# Patient Record
Sex: FEMALE | Race: WHITE | NOT HISPANIC OR LATINO | Employment: FULL TIME | URBAN - METROPOLITAN AREA
[De-identification: names, ages, dates, MRNs, and addresses within clinical notes are randomized per-mention and may not be internally consistent; named-entity substitution may affect disease eponyms.]

---

## 2017-06-13 ENCOUNTER — CONVERSION ENCOUNTER (OUTPATIENT)
Dept: GENERAL RADIOLOGY | Facility: HOSPITAL | Age: 42
End: 2017-06-13

## 2018-09-05 ENCOUNTER — CONVERSION ENCOUNTER (OUTPATIENT)
Dept: GENERAL RADIOLOGY | Facility: HOSPITAL | Age: 43
End: 2018-09-05

## 2020-02-06 ENCOUNTER — HOSPITAL ENCOUNTER (OUTPATIENT)
Dept: MAMMOGRAPHY | Facility: HOSPITAL | Age: 45
Discharge: HOME | End: 2020-02-06
Attending: OBSTETRICS & GYNECOLOGY

## 2021-03-23 ENCOUNTER — HOSPITAL ENCOUNTER (OUTPATIENT)
Dept: VACCINE CLINIC | Facility: HOSPITAL | Age: 46
Discharge: HOME | End: 2021-03-23
Attending: INTERNAL MEDICINE

## 2021-04-13 ENCOUNTER — HOSPITAL ENCOUNTER (OUTPATIENT)
Dept: VACCINE CLINIC | Facility: HOSPITAL | Age: 46
Discharge: HOME | End: 2021-04-13
Attending: INTERNAL MEDICINE

## 2022-07-13 ENCOUNTER — TRANSCRIBE ORDERS (OUTPATIENT)
Dept: ADMINISTRATIVE | Facility: HOSPITAL | Age: 47
End: 2022-07-13

## 2022-07-13 DIAGNOSIS — Z12.31 SCREENING MAMMOGRAM FOR HIGH-RISK PATIENT: Primary | ICD-10-CM

## 2022-09-12 ENCOUNTER — HOSPITAL ENCOUNTER (OUTPATIENT)
Dept: MAMMOGRAPHY | Facility: HOSPITAL | Age: 47
Discharge: HOME | End: 2022-09-12
Admitting: INTERNAL MEDICINE

## 2022-09-12 DIAGNOSIS — Z12.31 SCREENING MAMMOGRAM FOR HIGH-RISK PATIENT: ICD-10-CM

## 2022-09-12 PROCEDURE — 77067 SCR MAMMO BI INCL CAD: CPT

## 2022-09-12 PROCEDURE — 77063 BREAST TOMOSYNTHESIS BI: CPT

## 2022-10-24 ENCOUNTER — TELEPHONE (OUTPATIENT)
Dept: GASTROENTEROLOGY | Facility: CLINIC | Age: 47
End: 2022-10-24

## 2022-10-24 ENCOUNTER — CLINICAL SUPPORT (OUTPATIENT)
Dept: GASTROENTEROLOGY | Facility: CLINIC | Age: 47
End: 2022-10-24

## 2022-10-24 ENCOUNTER — PREP FOR SURGERY (OUTPATIENT)
Dept: OTHER | Facility: HOSPITAL | Age: 47
End: 2022-10-24

## 2022-10-24 DIAGNOSIS — Z12.11 ENCOUNTER FOR SCREENING FOR MALIGNANT NEOPLASM OF COLON: Primary | ICD-10-CM

## 2022-10-24 RX ORDER — HYDROXYZINE HYDROCHLORIDE 25 MG/1
25 TABLET, FILM COATED ORAL
COMMUNITY
Start: 2022-10-12

## 2022-10-24 RX ORDER — ATORVASTATIN CALCIUM 20 MG/1
20 TABLET, FILM COATED ORAL NIGHTLY
COMMUNITY
Start: 2022-10-16

## 2022-10-24 RX ORDER — LISINOPRIL 5 MG/1
5 TABLET ORAL NIGHTLY
COMMUNITY
Start: 2022-10-16

## 2022-10-24 NOTE — TELEPHONE ENCOUNTER
Nataliia Alcocer  REASON FOR CALL COLON SCREENING  SENT IN PREP 4L PREP  PROCEDURE DATE: 01/12/2023  NO PULMONOLOGIST / NO CARDIOLOGIST / NO BLOOD THINNER PER PT    No past medical history on file.  Allergies   Allergen Reactions   • Iodine Hives     SEAFOOD IODINE     No past surgical history on file.  Social History     Socioeconomic History   • Marital status:    Tobacco Use   • Smoking status: Every Day     Packs/day: 0.50     Types: Cigarettes   • Smokeless tobacco: Never   Vaping Use   • Vaping Use: Never used   Substance and Sexual Activity   • Drug use: Defer   • Sexual activity: Defer     No family history on file.    Current Outpatient Medications:   •  atorvastatin (LIPITOR) 20 MG tablet, TAKE 1 TABLET BY MOUTH EVERY DAY FOR 90 DAYS, Disp: , Rfl:   •  hydrOXYzine (ATARAX) 25 MG tablet, TAKING AS NEEDED, Disp: , Rfl:   •  lisinopril (PRINIVIL,ZESTRIL) 5 MG tablet, TAKE 1 TABLET BY MOUTH EVERY DAY FOR 90 DAYS, Disp: , Rfl:   •  metFORMIN (GLUCOPHAGE) 1000 MG tablet, TAKE 1 TABLET BY MOUTH TWICE A DAY FOR 90 DAYS, Disp: , Rfl:

## 2022-10-25 PROBLEM — Z12.11 ENCOUNTER FOR SCREENING FOR MALIGNANT NEOPLASM OF COLON: Status: ACTIVE | Noted: 2022-10-25

## 2023-01-06 NOTE — PRE-PROCEDURE INSTRUCTIONS
PT INSTRUCTED ON CLEAR LIQ DIET AND PO SPLIT PREP LAST BM SHOULD BE CLEAR  No meds to take am of the procedure  ARRIVAL TIME IS  0930 am ON 1/12/23 blood sugar ordered, urine preg ordered

## 2023-01-12 ENCOUNTER — ANESTHESIA (OUTPATIENT)
Dept: GASTROENTEROLOGY | Facility: HOSPITAL | Age: 48
End: 2023-01-12
Payer: COMMERCIAL

## 2023-01-12 ENCOUNTER — HOSPITAL ENCOUNTER (OUTPATIENT)
Facility: HOSPITAL | Age: 48
Setting detail: HOSPITAL OUTPATIENT SURGERY
Discharge: HOME | End: 2023-01-12
Attending: INTERNAL MEDICINE | Admitting: INTERNAL MEDICINE
Payer: COMMERCIAL

## 2023-01-12 ENCOUNTER — ANESTHESIA EVENT (OUTPATIENT)
Dept: GASTROENTEROLOGY | Facility: HOSPITAL | Age: 48
End: 2023-01-12
Payer: COMMERCIAL

## 2023-01-12 VITALS
BODY MASS INDEX: 28.72 KG/M2 | HEART RATE: 73 BPM | HEIGHT: 67 IN | OXYGEN SATURATION: 98 % | DIASTOLIC BLOOD PRESSURE: 63 MMHG | TEMPERATURE: 97.1 F | SYSTOLIC BLOOD PRESSURE: 95 MMHG | RESPIRATION RATE: 18 BRPM | WEIGHT: 182.98 LBS

## 2023-01-12 LAB
B-HCG UR QL: NEGATIVE
GLUCOSE BLDC GLUCOMTR-MCNC: 182 MG/DL (ref 70–99)

## 2023-01-12 PROCEDURE — 45378 DIAGNOSTIC COLONOSCOPY: CPT | Performed by: INTERNAL MEDICINE

## 2023-01-12 PROCEDURE — 25010000002 PROPOFOL 10 MG/ML EMULSION: Performed by: NURSE ANESTHETIST, CERTIFIED REGISTERED

## 2023-01-12 PROCEDURE — 82962 GLUCOSE BLOOD TEST: CPT

## 2023-01-12 PROCEDURE — 81025 URINE PREGNANCY TEST: CPT | Performed by: INTERNAL MEDICINE

## 2023-01-12 RX ORDER — SODIUM CHLORIDE, SODIUM LACTATE, POTASSIUM CHLORIDE, CALCIUM CHLORIDE 600; 310; 30; 20 MG/100ML; MG/100ML; MG/100ML; MG/100ML
30 INJECTION, SOLUTION INTRAVENOUS CONTINUOUS
Status: DISCONTINUED | OUTPATIENT
Start: 2023-01-12 | End: 2023-01-12 | Stop reason: HOSPADM

## 2023-01-12 RX ORDER — SODIUM CHLORIDE, SODIUM LACTATE, POTASSIUM CHLORIDE, CALCIUM CHLORIDE 600; 310; 30; 20 MG/100ML; MG/100ML; MG/100ML; MG/100ML
1000 INJECTION, SOLUTION INTRAVENOUS CONTINUOUS
Status: DISCONTINUED | OUTPATIENT
Start: 2023-01-12 | End: 2023-01-12 | Stop reason: HOSPADM

## 2023-01-12 RX ORDER — PROPOFOL 10 MG/ML
VIAL (ML) INTRAVENOUS AS NEEDED
Status: DISCONTINUED | OUTPATIENT
Start: 2023-01-12 | End: 2023-01-12 | Stop reason: SURG

## 2023-01-12 RX ORDER — LIDOCAINE HYDROCHLORIDE 20 MG/ML
INJECTION, SOLUTION EPIDURAL; INFILTRATION; INTRACAUDAL; PERINEURAL AS NEEDED
Status: DISCONTINUED | OUTPATIENT
Start: 2023-01-12 | End: 2023-01-12 | Stop reason: SURG

## 2023-01-12 RX ADMIN — SODIUM CHLORIDE, POTASSIUM CHLORIDE, SODIUM LACTATE AND CALCIUM CHLORIDE 1000 ML: 600; 310; 30; 20 INJECTION, SOLUTION INTRAVENOUS at 06:42

## 2023-01-12 RX ADMIN — PROPOFOL 200 MCG/KG/MIN: 10 INJECTION, EMULSION INTRAVENOUS at 07:35

## 2023-01-12 RX ADMIN — PROPOFOL 100 MG: 10 INJECTION, EMULSION INTRAVENOUS at 07:35

## 2023-01-12 RX ADMIN — LIDOCAINE HYDROCHLORIDE 100 MG: 20 INJECTION, SOLUTION EPIDURAL; INFILTRATION; INTRACAUDAL; PERINEURAL at 07:35

## 2023-01-12 NOTE — ANESTHESIA PREPROCEDURE EVALUATION
Anesthesia Evaluation     Patient summary reviewed and Nursing notes reviewed   no history of anesthetic complications:  NPO Solid Status: > 8 hours  NPO Liquid Status: > 2 hours           Airway   Mallampati: I  TM distance: >3 FB  Neck ROM: full  No difficulty expected  Dental      Pulmonary - negative pulmonary ROS and normal exam    breath sounds clear to auscultation  Cardiovascular - normal exam  Exercise tolerance: good (4-7 METS)    Rhythm: regular  Rate: normal    (+) hypertension,       Neuro/Psych- negative ROS  GI/Hepatic/Renal/Endo    (+)   diabetes mellitus type 2,     Musculoskeletal (-) negative ROS    Abdominal    Substance History - negative use     OB/GYN negative ob/gyn ROS         Other - negative ROS       ROS/Med Hx Other: PAT Nursing Notes unavailable.                   Anesthesia Plan    ASA 2     general     (Total IV Anesthesia    Patient understands anesthesia not responsible for dental damage.  )  intravenous induction     Anesthetic plan, risks, benefits, and alternatives have been provided, discussed and informed consent has been obtained with: patient.    Plan discussed with CRNA.        CODE STATUS:

## 2023-01-12 NOTE — ANESTHESIA POSTPROCEDURE EVALUATION
Patient: Nataliia Alcocer    Procedure Summary     Date: 01/12/23 Room / Location: [facility] ENDOSCOPY 4 / [facility] ENDOSCOPY    Anesthesia Start: 0733 Anesthesia Stop: 0755    Procedure: COLONOSCOPY FOR SCREENING Diagnosis:       Encounter for screening for malignant neoplasm of colon      (Encounter for screening for malignant neoplasm of colon [Z12.11])    Surgeons: Shamir Braga MD Provider: Jone Domingo MD    Anesthesia Type: general ASA Status: 2          Anesthesia Type: general    Vitals  Vitals Value Taken Time   BP 81/63 01/12/23 0814   Temp 36.2 °C (97.1 °F) 01/12/23 0809   Pulse 66 01/12/23 0814   Resp 18 01/12/23 0809   SpO2 97 % 01/12/23 0814   Vitals shown include unvalidated device data.        Post Anesthesia Care and Evaluation    Patient location during evaluation: bedside  Patient participation: complete - patient participated  Level of consciousness: awake  Pain management: adequate    Airway patency: patent  Anesthetic complications: No anesthetic complications  PONV Status: none  Cardiovascular status: acceptable and stable  Respiratory status: acceptable  Hydration status: acceptable    Comments: An Anesthesiologist personally participated in the most demanding procedures (including induction and emergence if applicable) in the anesthesia plan, monitored the course of anesthesia administration at frequent intervals and remained physically present and available for immediate diagnosis and treatment of emergencies.

## 2023-01-12 NOTE — H&P
"Pre Procedure History & Physical    Chief Complaint:   History colon polyp    Subjective     HPI:   History colon polyp    Past Medical History:   Past Medical History:   Diagnosis Date   • Diabetes mellitus (HCC)    • Hypertension    • Polycystic ovarian disease        Past Surgical History:  Past Surgical History:   Procedure Laterality Date   • DILATATION AND CURETTAGE N/A    • TEETH EXTRACTION N/A     wisdom teeth       Family History:  Family History   Problem Relation Age of Onset   • Cancer Father 65        LUNG   • Malig Hyperthermia Neg Hx        Social History:   reports that she has been smoking cigarettes. She has a 5.00 pack-year smoking history. She has never used smokeless tobacco. She reports that she does not currently use alcohol. Drug use questions deferred to the physician.    Medications:   Medications Prior to Admission   Medication Sig Dispense Refill Last Dose   • atorvastatin (LIPITOR) 20 MG tablet Take 20 mg by mouth Every Night.   1/11/2023   • hydrOXYzine (ATARAX) 25 MG tablet Take 25 mg by mouth. TAKING AS NEEDED   Past Week   • lisinopril (PRINIVIL,ZESTRIL) 5 MG tablet Take 5 mg by mouth Every Night.   1/11/2023   • metFORMIN (GLUCOPHAGE) 1000 MG tablet Take 1,000 mg by mouth 2 (Two) Times a Day With Meals. Hold Wednesday night and Thursday am 1/12 1/11/2023       Allergies:  Iodine        Objective     Blood pressure 108/79, pulse 81, temperature 97.6 °F (36.4 °C), temperature source Temporal, resp. rate 16, height 170.2 cm (67.01\"), weight 83 kg (182 lb 15.7 oz), last menstrual period 09/05/2022, SpO2 95 %.    Physical Exam   Constitutional: Pt is oriented to person, place, and time and well-developed, well-nourished, and in no distress.   Mouth/Throat: Oropharynx is clear and moist.   Neck: Normal range of motion.   Cardiovascular: Normal rate, regular rhythm and normal heart sounds.    Pulmonary/Chest: Effort normal and breath sounds normal.   Abdominal: Soft. Nontender  Skin: " Skin is warm and dry.   Psychiatric: Mood, memory, affect and judgment normal.     Assessment & Plan     Diagnosis:  Surveillance    Anticipated Surgical Procedure:  Colonoscopy    The risks, benefits, and alternatives of this procedure have been discussed with the patient or the responsible party- the patient understands and agrees to proceed.

## 2023-01-13 ENCOUNTER — TELEPHONE (OUTPATIENT)
Dept: GASTROENTEROLOGY | Facility: CLINIC | Age: 48
End: 2023-01-13
Payer: COMMERCIAL

## 2023-01-13 NOTE — TELEPHONE ENCOUNTER
I have reviewed the patients colonoscopy report. The report showed a normal colonoscopy.  No polyps removed or specimens collected.  Repeat colonoscopy in 10 years. Please place in recall. Send letter.     If patient has family history of colon cancer or if that status changes patient will need to be changed to 5 year recall.    Renal condition is improving with lifestyle modifications.  Continue current treatment regimen.  Fluid restriction.  labs at f/u  Renal condition will be reassessed in 1 month.

## 2023-01-27 NOTE — TELEPHONE ENCOUNTER
Called patient to confirm family HX of colon cancer. No answer. Left vm and call back number    Problem: Falls - Risk of:  Goal: Will remain free from falls  Description: Will remain free from falls  Outcome: Ongoing  Goal: Absence of physical injury  Description: Absence of physical injury  Outcome: Ongoing     Problem: Pain:  Goal: Pain level will decrease  Description: Pain level will decrease  Outcome: Ongoing  Goal: Control of acute pain  Description: Control of acute pain  Outcome: Ongoing  Goal: Control of chronic pain  Description: Control of chronic pain  Outcome: Ongoing     Problem: Nausea/Vomiting:  Goal: Absence of nausea/vomiting  Description: Absence of nausea/vomiting  Outcome: Ongoing  Goal: Able to drink  Description: Able to drink  Outcome: Ongoing  Goal: Able to eat  Description: Able to eat  Outcome: Ongoing  Goal: Ability to achieve adequate nutritional intake will improve  Description: Ability to achieve adequate nutritional intake will improve  Outcome: Ongoing

## 2023-02-09 NOTE — TELEPHONE ENCOUNTER
S/w patient, no family HX of colon cancer. Patient has been placed in 10 year colon recall. Care gap updated

## (undated) DEVICE — Device: Brand: DEFENDO AIR/WATER/SUCTION AND BIOPSY VALVE

## (undated) DEVICE — SOL IRRG H2O PL/BG 1000ML STRL

## (undated) DEVICE — SOLIDIFIER LIQLOC PLS 1500CC BT

## (undated) DEVICE — Device